# Patient Record
(demographics unavailable — no encounter records)

---

## 2024-11-07 NOTE — HISTORY OF PRESENT ILLNESS
[de-identified] : 20 F, RHD, with right wrist pain since this morning while lifting weights at practice while doing rapid clean  She endorse pain in her right ulnar sided wrist pain worse with pronation and supination as well as with writing Pain is about ulnar wrist joint line and over ulnar styloid, radiates up to elbow. She has not taken any medication for this No history of wrist injury

## 2024-11-07 NOTE — PHYSICAL EXAM
[de-identified] : Constitutional: Well-nourished, well-developed, No acute distress Respiratory:  Good respiratory effort, no SOB Lymphatic: No regional lymphadenopathy, no lymphedema Psychiatric: Pleasant and normal affect, alert and oriented x3 Musculoskeletal: normal except where as noted in regional exam  right wrist: APPEARANCE: no swelling, no marked deformities or malalignment POSITIVE TENDERNESS: + TTP of the extensor distal forearm extending into the dorsum of the wrist, ECU tendon and myotendinous junction, extensor digiti minimi tendon overlying the extensor wrist. NONTENDER: snuffbox, scapholunate, DRUJ, TFCC, FCU/FCR, ECU/ECRL/ECRB, radial/ulnar styloid, CMC, and MCP joints. ROM: full with pain at end range of wrist flexion in the extensor wrist.  RESISTIVE TESTING: Painful 4/5 resisted wrist and long finger extension, painful 4/5 resisted pronation/supination, painless resisted wrist flexion, and radial/ulnar deviation.  SPECIAL TESTS: neg Finkelstein's. neg Phalen's. neg reverse Phalen's. neg Schmidt's. neg fernandez test. neg TFCC grind. neg tinel's at the carpal tunnel Vasc: 2+ radial pulse Neuro: AIN, PIN, Ulnar nerve intact to motor Sensation: Intact to light touch throughout  [de-identified] :  The following radiographs were ordered and read by me during this patient's visit. I reviewed each radiograph in detail with the patient and discussed the findings as highlighted below.   3 views of the right wrist were obtained today that show no fracture, or dislocation. There are no degenerative changes seen. There is no malalignment. No obvious osseous abnormality. Otherwise unremarkable.

## 2024-11-07 NOTE — DISCUSSION/SUMMARY
[de-identified] : Patient was seen today for evaluation and management of acute right wrist pain, patient is diagnosed with likely ECU tenosynovitis after doing a hanging and clean weighted barbell exercise earlier today.  There is no evidence of fracture on x-ray.  No evidence of any myofascial or ligament tear/rupture.  Patient is prescribed a volar wrist brace to be worn during the day for support to help offload the area and allow some short period of rest.  Patient may continue taking Aleve as needed, advised to take 2 tablets with food, 2 times a day for the next 1 to 2 weeks (We discussed all possible side effects of this medication).  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  Follow-up care will likely occur on campus as part of regularly scheduled doctors clinic sessions.  Patient appreciates and agrees with current plan.  This note was generated using dragon medical dictation software.  A reasonable effort has been made for proofreading its contents, but typos may still remain.  If there are any questions or points of clarification needed please notify my office.

## 2024-11-13 NOTE — HISTORY OF PRESENT ILLNESS
[de-identified] : 20 F, RHD, here  for a follow up on  right wrist pain  DOI 11/7/24 after  lifting weights at practice while doing rapid clean At last visit diagnosed with ECU tenosynovitis. She reports wearing the brace which helps with the pain  Reports now endorsing numbness, tingling  on the radial side of the wrist  Does take ibuprofen which helps as well

## 2024-11-13 NOTE — DISCUSSION/SUMMARY
[de-identified] : Patient was seen today for reevaluation of right wrist pain, the wrist brace and rest/NSAIDs have been helpful in decreasing her wrist extensor tenosynovitis, however it is not yet fully resolved.  Patient's main reason today for evaluation is that she has developed right thumb numbness.  There is no involvement of the second or third digits.  Do not feel this is carpal tunnel syndrome as this numbness started after utilizing her volar wrist brace.  Patient is advised that this would normally be the first-line treatment for carpal tunnel syndrome, it is usually not a causative etiology for carpal tunnel syndrome.  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  At this time recommend transition from volar wrist brace into over-the-counter wrist widget.  Patient will continue with activity modification, and she will continue with therapy treatments with her  regarding her wrist tenosynovitis.  Follow up as needed.  Patient appreciates and agrees with current plan.  This note was generated using dragon medical dictation software.  A reasonable effort has been made for proofreading its contents, but typos may still remain.  If there are any questions or points of clarification needed please notify my office.

## 2024-11-13 NOTE — PHYSICAL EXAM
[de-identified] : Constitutional: Well-nourished, well-developed, No acute distress Respiratory:  Good respiratory effort, no SOB Lymphatic: No regional lymphadenopathy, no lymphedema Psychiatric: Pleasant and normal affect, alert and oriented x3 Musculoskeletal: normal except where as noted in regional exam  Right Wrist: APPEARANCE: no swelling, no marked deformities or malalignment POSITIVE TENDERNESS: + TTP of the extensor distal forearm extending into the dorsum of the wrist, ECU tendon and myotendinous junction, extensor digiti minimi tendon overlying the extensor wrist. NONTENDER: snuffbox, scapholunate, DRUJ, TFCC, FCU/FCR, ECU/ECRL/ECRB, radial/ulnar styloid, CMC, and MCP joints. ROM: full with pain at end range of wrist flexion in the extensor wrist.  RESISTIVE TESTING: Painful 4/5 resisted wrist and long finger extension, painful 4/5 resisted pronation/supination, painless resisted wrist flexion, and radial/ulnar deviation.  SPECIAL TESTS: neg Finkelstein's. neg Phalen's. neg reverse Phalen's. neg Schmidt's. neg fernandez test. neg TFCC grind. neg tinel's at the carpal tunnel Vasc: 2+ radial pulse Neuro: AIN, PIN, Ulnar nerve intact to motor Sensation: + tingling with mild numbness of the thumb, otherwise Intact to light touch throughout